# Patient Record
Sex: FEMALE | Race: WHITE
[De-identification: names, ages, dates, MRNs, and addresses within clinical notes are randomized per-mention and may not be internally consistent; named-entity substitution may affect disease eponyms.]

---

## 2017-10-16 ENCOUNTER — HOSPITAL ENCOUNTER (OUTPATIENT)
Dept: HOSPITAL 39 - LAB.O | Age: 72
Discharge: HOME | End: 2017-10-16
Attending: NURSE PRACTITIONER
Payer: MEDICARE

## 2017-10-16 DIAGNOSIS — R19.7: Primary | ICD-10-CM

## 2018-10-09 ENCOUNTER — HOSPITAL ENCOUNTER (OUTPATIENT)
Dept: HOSPITAL 39 - MAMMO | Age: 73
End: 2018-10-09
Attending: NURSE PRACTITIONER
Payer: MEDICARE

## 2018-10-09 DIAGNOSIS — Z12.31: Primary | ICD-10-CM

## 2018-10-11 NOTE — MAM
EXAM DESCRIPTION: 

3D Screening BILATERAL : Digital Mammography.



CLINICAL HISTORY: 

73 years Female SCREENING . No complaints. No personal history of

breast cancer. Mother with breast cancer. Childbirth.

Postmenopausal. Has taken HRT 5 or more years ago. Benign right

breast biopsy in 2002..  Lifetime risk of developing breast

cancer (Tyrer-Cuzick model)(%):  10.7



COMPARISON: 

Bilateral screening digital breast tomosynthesis 10/5/2017. 2-D

digital screening bilateral study 8/29/2016..   



TECHNIQUE: 

Bilateral CC and MLO projection full-field images, Digital

tomosynthesis mammographic technique.  Bilateral digital 2-D

full-field MLO images.  CAD not utilized. 



FINDINGS: 

The breast parenchymal density pattern is: Scattered areas of

fibroglandular density. No skin thickening or nipple retraction. 

Bilateral solitary microcalcifications. Densities fibroglandular

tissues are in the anterior and lateral breast more on the right

than the left.

No new focal, stellate mass or density, focal asymmetry , and no

suspicious microcalcifications bilaterally. Stable mammograms

compared to prior study.  



IMPRESSION: 

Benign exam.



BIRAD CATEGORY: 2 BENIGN FINDINGS.



RECOMMENDATIONS:

FOLLOW UP: Routine digital bilateral  screening, one year

interval from  October 2018.



Written communication explaining the IMPRESSION and follow-up,

will be mailed to the patient and referring health care provider.



According to the American College of Radiology, yearly mammograms

are recommended starting at age 40 and continuing as long as a

woman is in good health.  Any breast change noted on a breast

self-exam should be reported promptly to the patient's healthcare

provider.  Breast MRI is recommended for women with an

approximately 20-25% or greater lifetime risk of breast cancer,

including women with a strong family history of breast or ovarian

cancer and women who have been treated for Hodgkin's disease.



A negative mammographic report should not delay tissue diagnosis

in patients with significant clinical history or physical

findings.



Extremely dense breast tissue limits the sensitivity of digital

mammography. 



Electronically signed by:  Erik Mathews MD  10/11/2018 9:35 AM

[facility] Workstation: [id number]

## 2018-11-05 ENCOUNTER — HOSPITAL ENCOUNTER (EMERGENCY)
Dept: HOSPITAL 39 - ER | Age: 73
Discharge: HOME | End: 2018-11-05
Payer: MEDICARE

## 2018-11-05 VITALS — DIASTOLIC BLOOD PRESSURE: 80 MMHG | SYSTOLIC BLOOD PRESSURE: 147 MMHG | TEMPERATURE: 98.4 F

## 2018-11-05 VITALS — OXYGEN SATURATION: 98 %

## 2018-11-05 DIAGNOSIS — S80.02XA: ICD-10-CM

## 2018-11-05 DIAGNOSIS — Z79.899: ICD-10-CM

## 2018-11-05 DIAGNOSIS — Z87.891: ICD-10-CM

## 2018-11-05 DIAGNOSIS — M54.2: ICD-10-CM

## 2018-11-05 DIAGNOSIS — I50.9: ICD-10-CM

## 2018-11-05 DIAGNOSIS — I11.0: ICD-10-CM

## 2018-11-05 DIAGNOSIS — S00.11XA: ICD-10-CM

## 2018-11-05 DIAGNOSIS — Z79.82: ICD-10-CM

## 2018-11-05 DIAGNOSIS — S52.134A: Primary | ICD-10-CM

## 2018-11-05 DIAGNOSIS — E78.5: ICD-10-CM

## 2018-11-05 DIAGNOSIS — J44.9: ICD-10-CM
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
## 2018-11-05 DIAGNOSIS — K21.9: ICD-10-CM
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
## 2018-11-05 NOTE — RAD
EXAM DESCRIPTION: 



Wrist,Right 3 Views



CLINICAL HISTORY: 



fall



COMPARISON: 



None



FINDINGS: 



3 view(s) submitted. No fracture or dislocation is identified.

Bone marrow attenuation is unremarkable. No radiopaque foreign

body is identified.



IMPRESSION: 



No acute fracture or dislocation.



Electronically signed by:  Mark Halsted  11/5/2018 6:53 PM [facility]

Workstation: [id number]

## 2018-11-05 NOTE — RAD
EXAM DESCRIPTION: 



Knee,Left 2 or More Views



CLINICAL HISTORY: 



fall



COMPARISON: 



None



FINDINGS: 



2 view(s) submitted. No fracture or dislocation is identified.

Bone marrow attenuation is unremarkable. No radiopaque foreign

body is identified.



IMPRESSION: 



No acute fracture or dislocation.



Electronically signed by:  Mark Halsted  11/5/2018 6:52 PM [facility]

Workstation: [id number]

## 2018-11-05 NOTE — ED.PDOC
History of Present Illness





- General


Chief Complaint: Trauma


Stated Complaint: Hematoma R forehead, R arm and neck discomfort


Time Seen by Provider: 18 17:53


Source: patient


Exam Limitations: no limitations





- History of Present Illness


Initial Comments: 





pt fell after stepping off porch onto sidewalk. she hit her R eye and has pain 

to R arm and l knee.  Denies LOC


Timing/Duration: 1/2 hour


Severity: moderate


Improving Factors: nothing


Worsening Factors: nothing


Associated Symptoms: denies symptoms


Allergies/Adverse Reactions: 


Allergies





NO KNOWN ALLERGY Allergy (Unverified 18 17:39)


 








Home Medications: 


Ambulatory Orders





Alendronate Sodium 35 mg PO DAILY 18 


Aspirin [Baby Aspirin] 81 mg PO BEDTIME 18 


Atorvastatin Calcium [Lipitor] 20 mg PO BEDTIME 18 


Cetirizine HCl [ZyrTEC] 10 mg PO BEDTIME 18 


Cholecalciferol [Vitamin D] 1,000 unit PO DAILY 18 


Diclofenac Sodium [Diclofenac Sodium Dr] 50 - 100 mg PO DAILY 18 


Esomeprazole Magnesium [Nexium] 40 mg PO DAILY 18 


Oxybutynin Chloride [Ditropan Xl] 10 mg PO BEDTIME 18 


Potassium Chloride [Micro-K] 10 meq PO DAILY 18 


Tramadol HCl 50 mg PO Q4HWA PRN #20 tab 18 











Review of Systems





- Review of Systems


Constitutional: Denies: chills, weakness


EENTM: States: eye pain - R eye.  Denies: double vision


Respiratory: States: no symptoms reported


Cardiology: States: no symptoms reported


Gastrointestinal/Abdominal: States: no symptoms reported


Genitourinary: States: no symptoms reported


Musculoskeletal: States: joint pain, muscle pain, neck pain


Skin: States: no symptoms reported


Neurological: States: no symptoms reported


Endocrine: States: no symptoms reported





Past Medical History (General)





- Patient Medical History


Hx Stroke: No


Hx of COPD: Yes


Hx Cardiac Disorders: Yes - hyperlipidemia


Hx Congestive Heart Failure: Yes


Hx Hypertension: Yes


Hx Diabetes: No


Hx Gastroesophageal Reflux: Yes


Hx MRSA: No


Surgical History: cholecystectomy, Hysterectomy, other





- Vaccination History


Hx Influenza Vaccination: Yes - 


Hx Pneumococcal Vaccination: No





- Social History


Hx Tobacco Use: Yes - Quit around 





Family Medical History





- Family History


  ** Mother


Living Status: 


Hx Family Stroke: Yes - TIA's


Hx Family Cancer: Yes - renal cell





Physical Exam





- Physical Exam


General Appearance: Alert, Anxious


Eye Exam: bilateral normal


Ears, Nose, Throat: other - R periorbital hematoma without injury to eye


Neck: tender lateral - on R


Respiratory: chest non-tender, lungs clear


Extremity: other - R elbow has decreased ROM with 1+ edema, tender at elbow and 

wrist on R. No deformity to wrist.  L knee has hematoma to anterior and 

decreased ROM


Neurologic: alert, normal mood/affect, oriented x 3


Skin Exam: normal color


Lymphatic: no adenopathy





Departure





- Departure


Clinical Impression: 


Fracture of radial neck, right, closed


Qualifiers:


 Encounter type: initial encounter Fracture alignment: nondisplaced Qualified 

Code(s): S52.134A - Nondisplaced fracture of neck of right radius, initial 

encounter for closed fracture





Periorbital hematoma


Qualifiers:


 Laterality: right Qualified Code(s): H05.231 - Hemorrhage of right orbit





Contusion of left knee


Qualifiers:


 Encounter type: initial encounter Qualified Code(s): S80.02XA - Contusion of 

left knee, initial encounter





Disposition: Discharge to Home or Self Care


Condition: Good


Departure Forms:  ED Discharge - Pt. Copy, Patient Portal Self Enrollment


Instructions:  DI for Trauma


Referrals: 


Alanna Osullivan NP [Primary Care Provider] - 1-2 Weeks


Prescriptions: 


Tramadol HCl 50 mg PO Q4HWA PRN #20 tab


 PRN Reason: Moderate To Severe Pain


Home Medications: 


Ambulatory Orders





Alendronate Sodium 35 mg PO DAILY 18 


Aspirin [Baby Aspirin] 81 mg PO BEDTIME 18 


Atorvastatin Calcium [Lipitor] 20 mg PO BEDTIME 18 


Cetirizine HCl [ZyrTEC] 10 mg PO BEDTIME 18 


Cholecalciferol [Vitamin D] 1,000 unit PO DAILY 18 


Diclofenac Sodium [Diclofenac Sodium Dr] 50 - 100 mg PO DAILY 18 


Esomeprazole Magnesium [Nexium] 40 mg PO DAILY 18 


Oxybutynin Chloride [Ditropan Xl] 10 mg PO BEDTIME 18 


Potassium Chloride [Micro-K] 10 meq PO DAILY 18 


Tramadol HCl 50 mg PO Q4HWA PRN #20 tab 18

## 2018-11-05 NOTE — RAD
EXAM DESCRIPTION: 



Elbow,Right 3 Views



CLINICAL HISTORY: 



fall



COMPARISON: 



None



FINDINGS: 



3 view(s) submitted. There is a probable nondisplaced fracture of

the radius neck. There is no joint effusion. No other fracture or

dislocation.



IMPRESSION: Radius neck fracture. Elbow joint effusion.



Electronically signed by:  Mark Halsted  11/5/2018 6:51 PM [facility]

Workstation: [id number]

## 2018-11-05 NOTE — CT
EXAM DESCRIPTION: 



Head



CLINICAL HISTORY: 



head trauma



COMPARISON: 



None Available



TECHNIQUE: 



Contiguous axial CT images of the head were obtained.



Coronal and sagittal reconstructions were created from the axial

data.



This exam was performed according to our departmental

dose-optimization program, which includes automated exposure

control, adjustment of the mA and/or kV according to patient size

and/or use of iterative reconstruction technique.



FINDINGS:  

There is right preseptal soft tissue swelling.



Positioning limits detail.





There is no evidence of acute mass, mass effect, midline shift or

hemorrhage. The ventricles and extra-axial CSF spaces are

unremarkable. The brain parenchyma appears normal for the

patient's age. No acute abnormalities of the bones is seen.



IMPRESSION: 



No acute intracranial abnormality.



Electronically signed by:  Mark Halsted  11/5/2018 6:52 PM [facility]

Workstation: [id number]

## 2018-11-08 ENCOUNTER — HOSPITAL ENCOUNTER (OUTPATIENT)
Dept: HOSPITAL 39 - RAD | Age: 73
End: 2018-11-08
Attending: ORTHOPAEDIC SURGERY
Payer: MEDICARE

## 2018-11-08 DIAGNOSIS — S52.121A: Primary | ICD-10-CM

## 2018-11-08 NOTE — RAD
EXAM DESCRIPTION: 



Elbow,Right 3 Views



CLINICAL HISTORY: 



PAIN IN  RIGHT ELBOW



COMPARISON: 



5 November 2018. 



TECHNIQUE: 



3 views right



FINDINGS: 



Exam demonstrates a nondisplaced radial neck fracture.There has

been a reduction the size of the joint effusion. No significant

callus formation is yet evident



IMPRESSION: 



A nondisplaced radial neck fracture is again evident. No callus

formation is yet evident.



Electronically signed by:  Brayan Teague MD  11/8/2018 11:19 AM

[facility] Workstation: [id number]

## 2018-11-21 ENCOUNTER — HOSPITAL ENCOUNTER (OUTPATIENT)
Dept: HOSPITAL 39 - RAD | Age: 73
End: 2018-11-21
Attending: ORTHOPAEDIC SURGERY
Payer: MEDICARE

## 2018-11-21 DIAGNOSIS — S52.101D: Primary | ICD-10-CM

## 2018-11-25 NOTE — RAD
EXAM DESCRIPTION:

Elbow,Right 3 Views



CLINICAL HISTORY:

73 years Female, FX OF RADIAL NECK



COMPARISON:

Radiographs right elbow 11/8/2018.



TECHNIQUE:

3 views right elbow.



FINDINGS:

AP lateral and oblique. Subcapital compression of the right

proximal radial neck. Slightly more compression on the radial

aspect. No radiocapitellar dislocation. Proximal radioulnar joint

intact. No other fractures. No abnormal radiodense objects in the

soft tissues or joint spaces.



IMPRESSION:

Subcapital compression fracture of the right radial neck not

involving the joint space. No significant change since the prior

study.



Electronically signed by:  Erik Mathews MD  11/25/2018 1:41 PM

[facility] Workstation: [id number]

## 2018-11-30 ENCOUNTER — HOSPITAL ENCOUNTER (OUTPATIENT)
Dept: HOSPITAL 39 - RAD | Age: 73
End: 2018-11-30
Attending: ORTHOPAEDIC SURGERY
Payer: MEDICARE

## 2018-11-30 DIAGNOSIS — S52.101D: Primary | ICD-10-CM

## 2018-11-30 NOTE — RAD
EXAM DESCRIPTION: Elbow,Right 3 Views



CLINICAL HISTORY: FRACTURE OF RADIAL NECK



COMPARISON: Previous study November 21, 2000



TECHNIQUE: AP, Lateral, and Oblique



FINDINGS: 

Three-view right elbow shows displaced distal humeral fat pads

consistent with elbow joint effusion/hemarthrosis. Fractured

proximal radius appears impacted at the radial neck. Radial head

and capitellum are normally aligned on all views. Radial neck

fracture is unchanged in alignment compared to the previous study

though definite callus formation.

There is no underlying bone lesion.

There are no significant arthritic changes.

There is no radiopaque foreign body.



IMPRESSION:



Healing fracture of the right radial neck with right elbow

effusion/ hemarthrosis.



Electronically signed by:  Logan Jenkins MD  11/30/2018 2:39

PM [facility] Workstation: [id number]

## 2018-12-21 ENCOUNTER — HOSPITAL ENCOUNTER (OUTPATIENT)
Dept: HOSPITAL 39 - RAD | Age: 73
End: 2018-12-21
Attending: ORTHOPAEDIC SURGERY
Payer: MEDICARE

## 2018-12-21 DIAGNOSIS — S52.101D: Primary | ICD-10-CM

## 2018-12-21 NOTE — RAD
EXAM DESCRIPTION:

Elbow,Right 3 Views



CLINICAL HISTORY:

73 years Female, FRACTURE OF UPPER END OF RIGHT RADIUS



COMPARISON:

November 30, 2018



FINDINGS:

Again seen is a minimally displaced radial neck fracture which

remains largely nonunited. No cortical disruption is seen at the

articular surface of the radial head, and the radiocapitellar

joint is anatomically aligned. No additional fracture or

malalignment. Small right elbow joint effusion, decreased from

November, 2018.



IMPRESSION:

Minimally displaced, nonunited right radial neck fracture, not

significantly changed from November 30, 2018. No new abnormality.



Electronically signed by:  Narinder Piper MD  12/21/2018 7:51 AM [facility]

Workstation: [id number]

## 2019-01-07 ENCOUNTER — HOSPITAL ENCOUNTER (OUTPATIENT)
Dept: HOSPITAL 39 - RAD | Age: 74
End: 2019-01-07
Attending: ORTHOPAEDIC SURGERY
Payer: MEDICARE

## 2019-01-07 DIAGNOSIS — S52.101D: Primary | ICD-10-CM

## 2019-01-07 NOTE — RAD
EXAM DESCRIPTION: Elbow,Right 3 Views



CLINICAL HISTORY: S52.101D proximal radial neck fracture



COMPARISON: Previous study December 21, 2018



TECHNIQUE: AP, Lateral, and Oblique



FINDINGS: 

Three-view right elbow shows fracture line at the right radial

neck with impaction. No change in alignment since previous study.

There is minimal periosteal new bone formation with early

bridging callus laterally. Mild displacement of distal humeral

fat pads consistent with small elbow joint effusion. There is no

other bone lesion.

There are no significant arthritic changes.

There is no radiopaque foreign body.



IMPRESSION:



Healing fracture of the proximal right radius.



Electronically signed by:  Logan Jenkins MD  1/7/2019 9:27 AM

[facility] [[email or web address]] Workstation: [id number]

## 2019-02-07 ENCOUNTER — HOSPITAL ENCOUNTER (OUTPATIENT)
Dept: HOSPITAL 39 - RAD | Age: 74
End: 2019-02-07
Attending: ORTHOPAEDIC SURGERY
Payer: MEDICARE

## 2019-02-07 DIAGNOSIS — S52.101D: Primary | ICD-10-CM

## 2019-02-07 NOTE — RAD
EXAM DESCRIPTION: Elbow,Right 3 Views



CLINICAL HISTORY: 74 years Female, FRACTURE OF UPPER END OF RIGHT

RADIUS



COMPARISON: Radiographs of the right elbow dated 1/7/2019.



FINDINGS: The visualized bones are well-mineralized. No

significant healing of the radial neck fracture.

There is persistent soft tissue swelling and joint effusion. 



IMPRESSION:



No significant healing of the radial neck fracture is noted

compared to prior examination.



Electronically signed by:  Hazel Morel MD  2/7/2019 11:16 AM

[facility] Workstation: [id number]

## 2019-03-07 ENCOUNTER — HOSPITAL ENCOUNTER (OUTPATIENT)
Dept: HOSPITAL 39 - RAD | Age: 74
End: 2019-03-07
Attending: ORTHOPAEDIC SURGERY
Payer: MEDICARE

## 2019-03-07 DIAGNOSIS — S52.101D: Primary | ICD-10-CM

## 2019-03-07 NOTE — RAD
EXAM DESCRIPTION: Elbow,Right 3 Views



CLINICAL HISTORY: 74 years Female, S52.101D



COMPARISON: Right elbow radiographs 2/7/2019



TECHNIQUE: 3 views of the right elbow.



IMPRESSION:

Mild periosteal reaction about the known radial neck fracture,

but which is minimally increased when compared to 2/7/2019. This

may be secondary to slow healing process. No new acute displaced

fracture. Fracture fragments appear in near anatomic alignment.

No displacement.



Anterior and posterior fat pads are non-displaced.



No radiographically apparent soft tissue abnormality.



Electronically signed by:  Zay Ragsdale MD  3/7/2019 9:31 AM [facility]

Workstation: [id number]

## 2019-03-21 ENCOUNTER — HOSPITAL ENCOUNTER (OUTPATIENT)
Dept: HOSPITAL 39 - MRI | Age: 74
End: 2019-03-21
Payer: MEDICARE

## 2019-03-21 DIAGNOSIS — M51.15: Primary | ICD-10-CM

## 2019-03-22 NOTE — MRI
EXAM DESCRIPTION: Thoracic Spine w/o Contrast



CLINICAL HISTORY: 74 years Female, INTVRT DISC DISORDERS

W/RADICULOPATHY



COMPARISON: None.



TECHNIQUE: Multiplanar multiecho imaging of the thoracic spine

was performed without gadolinium administration.



FINDINGS: T1 and T2 hyperintense lesions with loss of signal on

fat suppression images are noted in T1, T5 and T7 vertebral

bodies. These are most likely consistent with hemangiomas. No

evidence of cortical disruption or soft tissue extension.



The vertebral body heights are well-maintained with no acute

compression deformity. There is multilevel disc desiccation and

loss of disc height. However no significant disc bulge is

identified to result in spinal canal stenosis. Multilevel facet

arthropathy is also noted. No significant neural foraminal

narrowing is identified.



Fusion of the anterior aspect of the mid thoracic spine from T4

through T10 level is identified with anterior osteophytes. These

changes are consistent with diffuse idiopathic skeletal

hyperostosis.



The visualized thoracic spinal cord appears grossly unremarkable.

The imaged prevertebral and paravertebral soft tissues also

appear normal.



IMPRESSION:

Fusion of the anterior aspect of the mid thoracic spine from T4

through T10 level is identified with anterior osteophytes. These

changes are consistent with diffuse idiopathic skeletal

hyperostosis.



Multilevel mild degenerative disc disease is noted. However no

significant canal stenosis or neural foraminal narrowing

throughout the thoracic spine.



Electronically signed by:  Hazel Morel MD  3/22/2019 9:32 AM

[facility] Workstation: [id number]

## 2019-08-12 ENCOUNTER — HOSPITAL ENCOUNTER (OUTPATIENT)
Dept: HOSPITAL 39 - AMB | Age: 74
Discharge: HOME | End: 2019-08-12
Attending: OPHTHALMOLOGY
Payer: MEDICARE

## 2019-08-12 VITALS — SYSTOLIC BLOOD PRESSURE: 120 MMHG | TEMPERATURE: 97 F | DIASTOLIC BLOOD PRESSURE: 76 MMHG | OXYGEN SATURATION: 93 %

## 2019-08-12 DIAGNOSIS — Z91.040: ICD-10-CM

## 2019-08-12 DIAGNOSIS — Z79.899: ICD-10-CM

## 2019-08-12 DIAGNOSIS — Z79.82: ICD-10-CM

## 2019-08-12 DIAGNOSIS — K21.9: ICD-10-CM

## 2019-08-12 DIAGNOSIS — I10: ICD-10-CM

## 2019-08-12 DIAGNOSIS — E66.9: ICD-10-CM

## 2019-08-12 DIAGNOSIS — Z88.8: ICD-10-CM

## 2019-08-12 DIAGNOSIS — H25.11: Primary | ICD-10-CM

## 2019-08-12 DIAGNOSIS — I25.10: ICD-10-CM

## 2019-08-12 PROCEDURE — 66984 XCAPSL CTRC RMVL W/O ECP: CPT

## 2019-08-12 PROCEDURE — 00142 ANES PX ON EYE LENS SURGERY: CPT

## 2019-08-26 ENCOUNTER — HOSPITAL ENCOUNTER (OUTPATIENT)
Dept: HOSPITAL 39 - AMB | Age: 74
End: 2019-08-26
Attending: OPHTHALMOLOGY
Payer: MEDICARE

## 2019-08-26 DIAGNOSIS — H25.12: Primary | ICD-10-CM

## 2019-08-26 DIAGNOSIS — Z79.899: ICD-10-CM

## 2019-08-26 DIAGNOSIS — Z91.040: ICD-10-CM

## 2019-08-26 DIAGNOSIS — Z79.82: ICD-10-CM

## 2019-08-26 DIAGNOSIS — I10: ICD-10-CM

## 2019-08-26 PROCEDURE — 66984 XCAPSL CTRC RMVL W/O ECP: CPT

## 2019-08-26 PROCEDURE — 00142 ANES PX ON EYE LENS SURGERY: CPT

## 2019-08-26 RX ADMIN — DEXAMETHASONE SODIUM PHOSPHATE ONE DROP: 1 SOLUTION/ DROPS OPHTHALMIC at 08:36

## 2019-08-26 RX ADMIN — Medication ONE DROP: at 08:36

## 2019-08-26 RX ADMIN — TOBRAMYCIN ONE DROP: 3 SOLUTION/ DROPS OPHTHALMIC at 08:44

## 2019-08-26 RX ADMIN — DEXAMETHASONE SODIUM PHOSPHATE ONE DROP: 1 SOLUTION/ DROPS OPHTHALMIC at 08:44

## 2019-08-26 RX ADMIN — LIDOCAINE HYDROCHLORIDE ONE ML: 10 INJECTION, SOLUTION EPIDURAL; INFILTRATION; INTRACAUDAL; PERINEURAL at 08:32

## 2019-08-26 RX ADMIN — BRIMONIDINE TARTRATE ONE DROP: 2 SOLUTION OPHTHALMIC at 08:44

## 2019-08-26 RX ADMIN — PROPARACAINE HYDROCHLORIDE ONE DROP: 5 SOLUTION/ DROPS OPHTHALMIC at 08:28

## 2019-08-26 RX ADMIN — BRIMONIDINE TARTRATE ONE DROP: 2 SOLUTION OPHTHALMIC at 08:37

## 2019-08-26 RX ADMIN — Medication ONE DROP: at 08:44

## 2019-08-26 RX ADMIN — TOBRAMYCIN ONE DROP: 3 SOLUTION/ DROPS OPHTHALMIC at 08:37

## 2019-10-17 ENCOUNTER — HOSPITAL ENCOUNTER (OUTPATIENT)
Dept: HOSPITAL 39 - MAMMO | Age: 74
End: 2019-10-17
Attending: NURSE PRACTITIONER
Payer: MEDICARE

## 2019-10-17 DIAGNOSIS — Z12.31: Primary | ICD-10-CM

## 2019-10-21 NOTE — MAM
EXAM DESCRIPTION: 

3D Screening BILATERAL : Digital Mammography.



CLINICAL HISTORY: 

74 years Female ANNUAL SCREENING . No complaints. No personal

history of breast cancer. Mother with breast cancer at age 86.

Menarche age 11. Childbirth. Postmenopausal. HRT unknown age and

duration.  Lifetime risk of developing breast cancer

(Tyrer-Cuzick model)(%):  10.9.



COMPARISON: 

Bilateral screening digital breast tomosynthesis 9 October 2018. 





TECHNIQUE: 

Bilateral CC and MLO projection full-field images, digital

tomosynthesis mammographic technique. Bilateral digital 2-D

full-field MLO images.  CAD not available for tomosynthesis or

2-D images.  



FINDINGS: 

The breast parenchymal density pattern is: Scattered areas of

fibroglandular density. No skin thickening or nipple retraction. 

Bilateral solitary microcalcifications. Bilateral skin moles.

No new focal, stellate mass or density, focal asymmetry , and no

suspicious microcalcifications bilaterally. Stable mammograms

compared to prior study.  



IMPRESSION: 

Benign exam.



BIRAD CATEGORY: 2 BENIGN FINDINGS.



RECOMMENDATIONS:

FOLLOW UP: Routine digital bilateral  mammographic screening, one

year interval from  October 2019.



Written communication explaining the IMPRESSION and follow-up,

will be mailed to the patient and referring health care provider.



According to the American College of Radiology, yearly mammograms

are recommended starting at age 40 and continuing as long as a

woman is in good health.  Any breast change noted on a breast

self-exam should be reported promptly to the patient's healthcare

provider.  Breast MRI is recommended for women with an

approximately 20-25% or greater lifetime risk of breast cancer,

including women with a strong family history of breast or ovarian

cancer and women who have been treated for Hodgkin's disease.



A negative mammographic report should not delay tissue diagnosis

in patients with significant clinical history or physical

findings.



Extremely dense breast tissue limits the sensitivity of digital

mammography. 



Electronically signed by:  Erik Mathews MD  10/21/2019 9:34 AM

[facility] Workstation: [id number]

## 2019-11-11 ENCOUNTER — HOSPITAL ENCOUNTER (OUTPATIENT)
Dept: HOSPITAL 39 - RAD | Age: 74
End: 2019-11-11
Attending: ORTHOPAEDIC SURGERY
Payer: MEDICARE

## 2019-11-11 DIAGNOSIS — M53.3: ICD-10-CM

## 2019-11-11 DIAGNOSIS — M47.896: ICD-10-CM

## 2019-11-11 DIAGNOSIS — M16.0: ICD-10-CM

## 2019-11-11 DIAGNOSIS — M17.11: Primary | ICD-10-CM

## 2019-11-11 NOTE — RAD
XR KNEE 4 OR MORE VIEWS



HISTORY: 74 years Female KNEE PAIN



COMPARISON: None.



TECHNIQUE: 4 radiographs of the right knee.



FINDINGS:



No acute fracture or dislocation.



Mild joint space narrowing of the medial knee joint compartment

with mild osteophytosis. Lateral joint compartment appears

maintained. Moderate osteophytosis and buttressing at the

patellofemoral articulation.



No radiographic evidence of a joint effusion. No diagnostic soft

tissue abnormality.



IMPRESSION:



Mild-to-moderate degenerative joint disease of the right knee,

most pronounced at the patellofemoral articulation and medial

compartment.



Electronically signed by:  Leonard Javed MD  11/11/2019

11:28 AM [facility] Workstation: [id number]
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
## 2019-12-05 ENCOUNTER — HOSPITAL ENCOUNTER (OUTPATIENT)
Dept: HOSPITAL 39 - RESP | Age: 74
End: 2019-12-05
Payer: MEDICARE

## 2019-12-05 DIAGNOSIS — J84.10: Primary | ICD-10-CM

## 2019-12-05 DIAGNOSIS — K44.9: ICD-10-CM

## 2019-12-05 DIAGNOSIS — M32.9: ICD-10-CM

## 2020-03-25 ENCOUNTER — HOSPITAL ENCOUNTER (EMERGENCY)
Dept: HOSPITAL 39 - ER | Age: 75
Discharge: HOME | End: 2020-03-25
Payer: MEDICARE

## 2020-03-25 VITALS — TEMPERATURE: 96.9 F | SYSTOLIC BLOOD PRESSURE: 128 MMHG | DIASTOLIC BLOOD PRESSURE: 67 MMHG | OXYGEN SATURATION: 99 %

## 2020-03-25 DIAGNOSIS — Z91.040: ICD-10-CM

## 2020-03-25 DIAGNOSIS — Z79.82: ICD-10-CM

## 2020-03-25 DIAGNOSIS — I10: ICD-10-CM

## 2020-03-25 DIAGNOSIS — Z88.8: ICD-10-CM

## 2020-03-25 DIAGNOSIS — S70.01XA: ICD-10-CM

## 2020-03-25 DIAGNOSIS — Z79.899: ICD-10-CM

## 2020-03-25 DIAGNOSIS — E78.5: ICD-10-CM

## 2020-03-25 DIAGNOSIS — K21.9: ICD-10-CM
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
## 2020-03-25 DIAGNOSIS — S22.31XA: Primary | ICD-10-CM

## 2020-03-25 DIAGNOSIS — Z88.6: ICD-10-CM

## 2020-03-25 DIAGNOSIS — J44.9: ICD-10-CM
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
## 2020-03-25 DIAGNOSIS — Z87.891: ICD-10-CM

## 2020-03-25 DIAGNOSIS — Z88.5: ICD-10-CM

## 2020-03-25 DIAGNOSIS — S80.812A: ICD-10-CM

## 2020-03-25 DIAGNOSIS — S40.011A: ICD-10-CM

## 2020-03-25 DIAGNOSIS — S93.401A: ICD-10-CM

## 2020-03-25 PROCEDURE — 72125 CT NECK SPINE W/O DYE: CPT

## 2020-03-25 PROCEDURE — 73610 X-RAY EXAM OF ANKLE: CPT

## 2020-03-25 PROCEDURE — 70450 CT HEAD/BRAIN W/O DYE: CPT

## 2020-03-25 PROCEDURE — 90715 TDAP VACCINE 7 YRS/> IM: CPT

## 2020-03-25 PROCEDURE — 90471 IMMUNIZATION ADMIN: CPT

## 2020-03-25 PROCEDURE — 72170 X-RAY EXAM OF PELVIS: CPT

## 2020-03-25 PROCEDURE — 71045 X-RAY EXAM CHEST 1 VIEW: CPT

## 2020-03-25 PROCEDURE — 73630 X-RAY EXAM OF FOOT: CPT
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
## 2020-10-20 ENCOUNTER — HOSPITAL ENCOUNTER (OUTPATIENT)
Dept: HOSPITAL 39 - MAMMO | Age: 75
End: 2020-10-20
Attending: NURSE PRACTITIONER
Payer: MEDICARE

## 2020-10-20 DIAGNOSIS — Z12.31: Primary | ICD-10-CM

## 2020-10-22 NOTE — MAM
EXAM DESCRIPTION: 

3D Screening BILATERAL : Digital Mammography.



CLINICAL HISTORY: 

75 years Female ANNUAL SCREENING . No complaints. Mother with

breast cancer age 82. Menarche age 13. Childbirth age 19.

Menopause age unknown. HRT unknown duration. Right breast cyst

aspiration benign. Lifetime risk of developing breast cancer

(Tyrer-Cuzick model)(%):  10.9.



COMPARISON: 

Bilateral screening digital breast tomosynthesis October 2019 and

October 2018.  



TECHNIQUE: 

Bilateral CC and MLO projection full-field images, digital

tomosynthesis mammographic technique. Bilateral digital 2-D

full-field MLO images.  CAD available for 2-D images.  



FINDINGS: 

The breast parenchymal density pattern is: Scattered areas of

fibroglandular density. No skin thickening or nipple retraction. 

Skin mole markers. Vascular calcifications. Solitary

microcalcifications.

No new focal, stellate mass or density, focal asymmetry , and no

suspicious microcalcifications bilaterally. Stable mammograms

compared to prior study.  



IMPRESSION: 

Benign exam.



BIRAD CATEGORY: 2 BENIGN FINDINGS.



RECOMMENDATIONS:

FOLLOW UP: Routine digital bilateral  mammographic screening, one

year interval from  October 2020.



Written communication explaining the IMPRESSION and follow-up,

will be mailed to the patient and referring health care provider.



According to the [employer], yearly mammograms

are recommended starting at age 40 and continuing as long as a

woman is in good health.  Any breast change noted on a breast

self-exam should be reported promptly to the patient's healthcare

provider.  Breast MRI is recommended for women with an

approximately 20-25% or greater lifetime risk of breast cancer,

including women with a strong family history of breast or ovarian

cancer and women who have been treated for Hodgkin's disease.  A

negative mammographic report should not delay tissue diagnosis in

patients with significant clinical history or physical findings. 

Extremely dense breast tissue limits the sensitivity of digital

mammography. 





Electronically signed by:  Erik Mathews MD  10/22/2020 8:29 PM

CDT Workstation: [id number]

## 2021-03-02 ENCOUNTER — HOSPITAL ENCOUNTER (OUTPATIENT)
Dept: HOSPITAL 39 - LAB.O | Age: 76
End: 2021-03-02
Payer: MEDICARE

## 2021-03-02 DIAGNOSIS — M06.4: Primary | ICD-10-CM

## 2024-09-18 NOTE — CT
EXAM DESCRIPTION: 



CTA Chest



CLINICAL HISTORY: 



74 years, Female, PULMONARY FIBROSIS



COMPARISON: 



None



TECHNIQUE: 



Rapid bolus administration of  nonionicIV contrast was performed

with thin-section axial scanning of the chest performed in a

dynamic fashion. Reconstructed multiplanar and three dimensional

MIP and/or VRT images were created on a separate dedicated

workstation were reviewed along with the source axial images and

stored in the patient's medical record.  Stenoses were evaluated

using the NASCET criteria. This examination was performed before

and after contrast enhancement



This exam was performed according to our departmental

dose-optimization program, which includes automated exposure

control, adjustment of the mA and/or kV according to patient size

and/or use of iterative reconstruction technique.



FINDINGS: 



Preliminary noncontrast imaging demonstrates small hiatal hernia

and moderate coronary artery calcification with no evidence of

pericardial effusion or pleural effusion. Linear scarring or

fibrosis at each lung bases predominantly posteriorly and left

greater than right is present with very little interstitial

fibrotic lung disease and no significant emphysematous change

noted. No dense infiltrate pleural effusion or large mass is

noted. Moderate kyphosis of the dorsal spine with degenerative

change without compression deformity is noted. There is

hypertrophic osteophyte formation and fusion of the anterior

longitudinal ligament through the midthoracic level.



Bolus enhanced examination of the pulmonary vascularity

demonstrates mild aortic calcification without aneurysm. The

pulmonary outflow tract main pulmonary arteries, lobar branches

and segmental branches bilaterally or normally opacified. No

evidence of filling defect or pulmonary embolus is seen.



Thoracic inlet hilar regions are unremarkable. Below the

diaphragm fatty infiltrated pancreas and surgical absence of the

gallbladder noted.



IMPRESSION: 



1.  Negative CT pulmonary angiogram for pulmonary embolus.

2.  Linear scarring or less likely platelike atelectasis both

posterior lung bases with very little interstitial fibrotic lung

disease or emphysematous change noted.

3.  No acute consolidation or pleural effusions or inflammatory

changes or dominant mass is noted.

4.  Small hiatal hernia and fatty infiltrated pancreas.



Electronically signed by:  Alex Hansen MD  12/6/2019 8:21 AM

CST Workstation: [id number] Is the patient driving? If yes, then I am not comfortable patient driving while recovering after spinal cord stimulator implant and on pain meds.